# Patient Record
Sex: FEMALE | Race: WHITE | Employment: UNEMPLOYED | URBAN - METROPOLITAN AREA
[De-identification: names, ages, dates, MRNs, and addresses within clinical notes are randomized per-mention and may not be internally consistent; named-entity substitution may affect disease eponyms.]

---

## 2024-06-03 ENCOUNTER — APPOINTMENT (OUTPATIENT)
Dept: GENERAL RADIOLOGY | Age: 19
End: 2024-06-03
Attending: PHYSICIAN ASSISTANT

## 2024-06-03 ENCOUNTER — HOSPITAL ENCOUNTER (EMERGENCY)
Age: 19
Discharge: HOME | End: 2024-06-03
Attending: EMERGENCY MEDICINE

## 2024-06-03 VITALS
RESPIRATION RATE: 22 BRPM | TEMPERATURE: 100 F | DIASTOLIC BLOOD PRESSURE: 82 MMHG | SYSTOLIC BLOOD PRESSURE: 127 MMHG | BODY MASS INDEX: 31.28 KG/M2 | OXYGEN SATURATION: 98 % | HEIGHT: 62 IN | HEART RATE: 120 BPM | WEIGHT: 170 LBS

## 2024-06-03 DIAGNOSIS — J02.0 STREPTOCOCCUS PHARYNGITIS: ICD-10-CM

## 2024-06-03 DIAGNOSIS — J06.9 VIRAL UPPER RESPIRATORY ILLNESS: Primary | ICD-10-CM

## 2024-06-03 LAB
POCT INFLUENZA A: NEGATIVE
POCT INFLUENZA B: NEGATIVE
SARS-COV-2 RNA RESP QL NAA+PROBE: NOT DETECTED

## 2024-06-03 PROCEDURE — 94640 AIRWAY INHALATION TREATMENT: CPT

## 2024-06-03 PROCEDURE — 87430 STREP A AG IA: CPT | Performed by: PHYSICIAN ASSISTANT

## 2024-06-03 PROCEDURE — 71046 X-RAY EXAM CHEST 2 VIEWS: CPT | Performed by: PHYSICIAN ASSISTANT

## 2024-06-03 PROCEDURE — 99284 EMERGENCY DEPT VISIT MOD MDM: CPT

## 2024-06-03 PROCEDURE — 87502 INFLUENZA DNA AMP PROBE: CPT | Performed by: PHYSICIAN ASSISTANT

## 2024-06-03 RX ORDER — ALBUTEROL SULFATE 90 UG/1
4 AEROSOL, METERED RESPIRATORY (INHALATION) ONCE
Status: COMPLETED | OUTPATIENT
Start: 2024-06-03 | End: 2024-06-03

## 2024-06-03 RX ORDER — AMOXICILLIN AND CLAVULANATE POTASSIUM 875; 125 MG/1; MG/1
1 TABLET, FILM COATED ORAL 2 TIMES DAILY
Qty: 20 TABLET | Refills: 0 | Status: SHIPPED | OUTPATIENT
Start: 2024-06-03 | End: 2024-06-13

## 2024-06-03 RX ORDER — PREDNISONE 20 MG/1
40 TABLET ORAL DAILY
Qty: 10 TABLET | Refills: 0 | Status: SHIPPED | OUTPATIENT
Start: 2024-06-03 | End: 2024-06-08

## 2024-06-04 NOTE — ED PROVIDER NOTES
Patient Seen in: [facility] In [place]      History     Chief Complaint   Patient presents with    Cough     Stated Complaint: Cough x1week - difficulty breathing O2 99%    Subjective:   HPI    18-year-old female.  No significant medical history.  Patient states that she has had a persistent cough for the past month.  She was evaluated by her primary care physician and placed on some type medication.  While on this medication, her symptoms did improve but never totally resolved.  Acutely, today, patient is also developed fever, myalgia, headache and new sore throat.  Cough persists.  No vomiting or diarrhea    Objective:   History reviewed. No pertinent past medical history.           History reviewed. No pertinent surgical history.             Social History     Socioeconomic History    Marital status: Single   Tobacco Use    Smoking status: Never    Smokeless tobacco: Never              Review of Systems    Positive for stated complaint: Cough x1week - difficulty breathing O2 99%  Other systems are as noted in HPI.  Constitutional and vital signs reviewed.      All other systems reviewed and negative except as noted above.    Physical Exam     ED Triage Vitals [06/03/24 2137]   /82   Pulse 120   Resp 22   Temp 99.6 °F (37.6 °C)   Temp src Oral   SpO2 98 %   O2 Device None (Room air)       Current Vitals:   Vital Signs  BP: 127/82  Pulse: 120  Resp: 22  Temp: 99.6 °F (37.6 °C)  Temp src: Oral    Oxygen Therapy  SpO2: 98 %  O2 Device: None (Room air)            Physical Exam    Gen: Well appearing, well groomed, alert and aware x 3  Neck: Supple, full range of motion, no thyromegaly or lymphadenopathy.  Eye examination: EOMs are intact, normal conjunctival  ENT: No injection noted to the bilateral auditory canals; no loss of landmarks. Normal nasal mucosa without audible nasal congestion.  Oropharynx is patent without evidence of erythema, exudates or deviation.  No stridor to  auscultation  Lung: No distress, RR, no retraction, expiratory wheezing to the right upper lobe  Cardio: Tachycardic, normal S1-S2, no murmur appreciable  Skin: No sign of trauma, Skin warm and dry, no induration or sign of infection.  No rash noted      ED Course     Labs Reviewed   RAPID STREP A SCREEN (LC) - Abnormal; Notable for the following components:       Result Value    Rapid Strep A Result Positive for Beta Streptococcus, Group A (*)     All other components within normal limits   RAPID SARS-COV-2 BY PCR - Normal   POCT FLU TEST - Normal    Narrative:     This assay is a rapid molecular in vitro test utilizing nucleic acid amplification of influenza A and B viral RNA.                      MOMO      My supervising physician was involved in the management of this patient.    Patient arrives febrile and tachycardic.  She received antipyretics while in the triage area.  She has expiratory wheezing to the right upper lobe.  She complains of new malaise, myalgia, headache and sore throat today.  Continue cough for the past month.  Possibly worsened    Influenza, COVID and strep performed.  Chest x-ray to rule out pneumonia      Influenza and COVID are negative.  Returned positive for Streptococcus pharyngitis.  Chest x-ray demonstrates no acute consolidation    Respiratory technician perform spacer training.  4 puffs of albuterol administered.  Wheezing resolved.  Patient placed on Augmentin and prednisone.  Continue albuterol.                               Medical Decision Making      Disposition and Plan     Clinical Impression:  1. Viral upper respiratory illness    2. Streptococcus pharyngitis         Disposition:  Discharge  6/3/2024 11:45 pm    Follow-up:  No follow-up provider specified.        Medications Prescribed:  Current Discharge Medication List        START taking these medications    Details   amoxicillin clavulanate 875-125 MG Oral Tab Take 1 tablet by mouth 2 (two) times daily for 10  days.  Qty: 20 tablet, Refills: 0      predniSONE 20 MG Oral Tab Take 2 tablets (40 mg total) by mouth daily for 5 days.  Qty: 10 tablet, Refills: 0

## 2024-06-04 NOTE — DISCHARGE INSTRUCTIONS
Continue inhaler usage.  Take antibiotic as written.  Take steroid course.  Push clear fluids.  Get plenty of rest

## 2024-06-04 NOTE — ED INITIAL ASSESSMENT (HPI)
Patient here with cough. States she saw her doctor a month ago, started on medications for possible bronchitis. States symptoms had improved. Woke today with cough, sore throat and shortness of breath. Denies fevers.

## 2025-04-29 ENCOUNTER — HOSPITAL ENCOUNTER (EMERGENCY)
Age: 20
Discharge: HOME | End: 2025-04-29
Attending: EMERGENCY MEDICINE

## 2025-04-29 VITALS
HEART RATE: 99 BPM | DIASTOLIC BLOOD PRESSURE: 78 MMHG | SYSTOLIC BLOOD PRESSURE: 114 MMHG | HEIGHT: 62 IN | BODY MASS INDEX: 32.2 KG/M2 | TEMPERATURE: 97 F | WEIGHT: 175 LBS | RESPIRATION RATE: 18 BRPM | OXYGEN SATURATION: 98 %

## 2025-04-29 DIAGNOSIS — B34.9 VIRAL SYNDROME: Primary | ICD-10-CM

## 2025-04-29 PROCEDURE — 87430 STREP A AG IA: CPT

## 2025-04-29 PROCEDURE — 99283 EMERGENCY DEPT VISIT LOW MDM: CPT

## 2025-04-29 PROCEDURE — 87430 STREP A AG IA: CPT | Performed by: EMERGENCY MEDICINE

## 2025-04-29 RX ORDER — VALACYCLOVIR HYDROCHLORIDE 1 G/1
1000 TABLET, FILM COATED ORAL EVERY 12 HOURS
Qty: 14 TABLET | Refills: 0 | Status: SHIPPED | OUTPATIENT
Start: 2025-04-29 | End: 2025-05-06

## 2025-04-30 NOTE — ED PROVIDER NOTES
Patient Seen in: [facility] In [place]      History     Chief Complaint   Patient presents with    Sore Throat     Stated Complaint: sore throat, rash    Subjective:   HPI    Here for rash on dorsums of both hands predominately of her right as well as a sore throat.      Child and family recently had influenza    No fevers  No respiratory symptoms  No GI symptoms  No known exposures    History of Present Illness               Objective:     History reviewed. No pertinent past medical history.           History reviewed. No pertinent surgical history.             Social History     Socioeconomic History    Marital status: Single   Tobacco Use    Smoking status: Never    Smokeless tobacco: Never   Vaping Use    Vaping status: Never Used   Substance and Sexual Activity    Alcohol use: Never    Drug use: Never                                Physical Exam     ED Triage Vitals [04/29/25 1329]   /78   Pulse 99   Resp 18   Temp 97.2 °F (36.2 °C)   Temp src Temporal   SpO2 98 %   O2 Device        Current Vitals:   Vital Signs  BP: 114/78  Pulse: 99  Resp: 18  Temp: 97.2 °F (36.2 °C)  Temp src: Temporal    Oxygen Therapy  SpO2: 98 %        Physical Exam  Physical Exam   Constitutional: Awake, alert, well appearing  Head: Normocephalic and atraumatic.   Eyes: Conjunctivae are normal. Pupils are equal, round, and reactive to light.   Neck: Normal range of motion. No JVD  Cardiovascular: Normal rate, regular rhythm  Pulmonary/Chest: Normal effort.  No accessory muscle use.  No cyanosis.  Abdominal: Soft. Not distended.  Neurological: Pt is alert and oriented to person, place, and time. no cranial nerve deficits. Speech fluent      Oropharynx with with erythema, no swelling, there are 3-4 blisters and ulcerations scattered throughout.      Herpetic appearing rash predominant in the dorsum of her right hand less so on her left.    Blistered  No sloughing  Pruritic per patient but  nontender    Blanches  Physical Exam                ED Course     Labs Reviewed   RAPID STREP A SCREEN (LC) - Normal    Narrative:     A confirmatory culture is recommended if clinically indicated.          Results                                 MDM      Differential: Hand-foot-and-mouth/coxsackie, zoster, HSV, other viral syndrome.    -Suspect some sort of viral syndrome.  No evidence for bacterial process.  Discussed possibility of this being a herpes virus infection and will give her zoster to see if it helps.    Would return if there is any evolution of symptoms.    Of note, the patient is fully vaccinated, do not suspect measles        Medical Decision Making      Disposition and Plan     Clinical Impression:  1. Viral syndrome         Disposition:  Discharge  4/29/2025  2:15 pm    Follow-up:  Cira Lara MD  [address]  [facility] [address] [phone]    Follow up            Medications Prescribed:  Discharge Medication List as of 4/29/2025  2:17 PM        START taking these medications    Details   valACYclovir 1 G Oral Tab Take 1 tablet (1,000 mg total) by mouth every 12 (twelve) hours for 7 days., Normal, Disp-14 tablet, R-0             Supplementary Documentation:

## 2025-05-24 ENCOUNTER — APPOINTMENT (OUTPATIENT)
Dept: GENERAL RADIOLOGY | Age: 20
End: 2025-05-24
Attending: EMERGENCY MEDICINE

## 2025-05-24 ENCOUNTER — HOSPITAL ENCOUNTER (EMERGENCY)
Age: 20
Discharge: HOME | End: 2025-05-24
Attending: EMERGENCY MEDICINE

## 2025-05-24 VITALS
DIASTOLIC BLOOD PRESSURE: 68 MMHG | HEART RATE: 85 BPM | HEIGHT: 62 IN | WEIGHT: 198.63 LBS | RESPIRATION RATE: 20 BRPM | OXYGEN SATURATION: 100 % | SYSTOLIC BLOOD PRESSURE: 115 MMHG | BODY MASS INDEX: 36.55 KG/M2 | TEMPERATURE: 99 F

## 2025-05-24 DIAGNOSIS — J98.11 ATELECTASIS: ICD-10-CM

## 2025-05-24 DIAGNOSIS — J98.01 BRONCHOSPASM: ICD-10-CM

## 2025-05-24 DIAGNOSIS — R05.1 ACUTE COUGH: Primary | ICD-10-CM

## 2025-05-24 PROCEDURE — 99284 EMERGENCY DEPT VISIT MOD MDM: CPT

## 2025-05-24 PROCEDURE — 71045 X-RAY EXAM CHEST 1 VIEW: CPT | Performed by: EMERGENCY MEDICINE

## 2025-05-24 PROCEDURE — 87502 INFLUENZA DNA AMP PROBE: CPT | Performed by: EMERGENCY MEDICINE

## 2025-05-24 PROCEDURE — 94640 AIRWAY INHALATION TREATMENT: CPT

## 2025-05-24 RX ORDER — ALBUTEROL SULFATE 0.83 MG/ML
2.5 SOLUTION RESPIRATORY (INHALATION) EVERY 4 HOURS PRN
Qty: 30 EACH | Refills: 0 | Status: SHIPPED | OUTPATIENT
Start: 2025-05-24 | End: 2025-06-23

## 2025-05-24 RX ORDER — ACETAMINOPHEN 500 MG
1000 TABLET ORAL ONCE
Status: COMPLETED | OUTPATIENT
Start: 2025-05-24 | End: 2025-05-24

## 2025-05-24 RX ORDER — BENZONATATE 200 MG/1
200 CAPSULE ORAL 3 TIMES DAILY PRN
Qty: 30 CAPSULE | Refills: 0 | Status: SHIPPED | OUTPATIENT
Start: 2025-05-24 | End: 2025-06-23

## 2025-05-24 RX ORDER — ALBUTEROL SULFATE 90 UG/1
2 INHALANT RESPIRATORY (INHALATION) EVERY 4 HOURS PRN
Qty: 1 EACH | Refills: 0 | Status: SHIPPED | OUTPATIENT
Start: 2025-05-24 | End: 2025-06-23

## 2025-05-24 RX ORDER — ALBUTEROL SULFATE 90 UG/1
2 INHALANT RESPIRATORY (INHALATION) ONCE
Status: COMPLETED | OUTPATIENT
Start: 2025-05-24 | End: 2025-05-24

## 2025-05-24 NOTE — ED INITIAL ASSESSMENT (HPI)
To ER for eval of a cough x1 week. Pt has occ posttussive emesis and states her back hurts from coughing. The lungs are clear to ausc. Taking Cepacol and a cough syrup without success.

## 2025-05-24 NOTE — ED PROVIDER NOTES
Patient Seen in: [facility] In [place]        History  Chief Complaint   Patient presents with    Cough/URI     \"I've taken everything for it and it won't stop\"     Stated Complaint: cough 1 week    Subjective:   Patient is a 19-year-old female presents emergency room for cough for the last week.  Patient reports she has tried \"everything\" patient has tried Cepacol lozenges and 1 dose of Delsym on clarification.  Patient has had no fevers.  Positive sick contacts she has been under a great deal of stress.  Patient denies tobacco use vaping etc. she denies possibility of pregnancy nontoxic-appearing.  Occasional bronchospastic cough    The history is provided by the patient and a parent.                     Objective:     History reviewed. No pertinent past medical history.           History reviewed. No pertinent surgical history.             Social History     Socioeconomic History    Marital status: Single   Tobacco Use    Smoking status: Never    Smokeless tobacco: Never   Vaping Use    Vaping status: Never Used   Substance and Sexual Activity    Alcohol use: Never    Drug use: Never                                Physical Exam    ED Triage Vitals [05/24/25 0250]   /68   Pulse 85   Resp 20   Temp 98.7 °F (37.1 °C)   Temp src Oral   SpO2 100 %   O2 Device None (Room air)       Current Vitals:   Vital Signs  BP: 115/68  Pulse: 85  Resp: 20  Temp: 98.7 °F (37.1 °C)  Temp src: Oral    Oxygen Therapy  SpO2: 100 %  O2 Device: None (Room air)            Physical Exam  Vitals and nursing note reviewed.   Constitutional:       General: She is not in acute distress.     Appearance: Normal appearance. She is normal weight. She is not ill-appearing, toxic-appearing or diaphoretic.   HENT:      Head: Normocephalic and atraumatic.      Mouth/Throat:      Mouth: Mucous membranes are moist.   Eyes:      Extraocular Movements: Extraocular movements intact.      Pupils: Pupils are equal, round, and  reactive to light.   Cardiovascular:      Rate and Rhythm: Normal rate and regular rhythm.      Pulses: Normal pulses.   Pulmonary:      Effort: Pulmonary effort is normal. No respiratory distress.      Breath sounds: Normal breath sounds. No wheezing.      Comments: Bronchospastic cough  Abdominal:      General: Bowel sounds are normal. There is no distension.      Palpations: Abdomen is soft.      Tenderness: There is no abdominal tenderness.   Musculoskeletal:         General: Normal range of motion.   Skin:     General: Skin is warm.      Capillary Refill: Capillary refill takes less than 2 seconds.   Neurological:      General: No focal deficit present.      Mental Status: She is alert and oriented to person, place, and time.      Cranial Nerves: No cranial nerve deficit.      Sensory: No sensory deficit.      Motor: No weakness.   Psychiatric:         Mood and Affect: Mood normal.         Behavior: Behavior normal.                 ED Course  Labs Reviewed   POCT FLU TEST - Normal    Narrative:     This assay is a rapid molecular in vitro test utilizing nucleic acid amplification of influenza A and B viral RNA.   RAPID SARS-COV-2 BY PCR - Normal          Chest x-ray shows linear densities in the medial lung bases likely representing atelectasis mildly reduced lung volumes no acute abnormality no infiltrate or pleural effusion or pneumothorax normal heart size                  MDM     Social -negative tobacco, negative etoh, negative drugs  Family History-noncontributory  Past Medical History-no significant past medical history    Differential diagnosis before testing included viral syndrome, bronchospasm, bronchitis, pneumonia    Co-morbidities that add to the complexity of management include: None    Testing ordered during this visit included chest x-ray swabs for COVID and flu    Radiographic images  I personally reviewed the radiographs and my individual interpretation shows atelectasis  I also reviewed the  official reports that showed Chest x-ray shows linear densities in the medial lung bases likely representing atelectasis mildly reduced lung volumes no acute abnormality no infiltrate or pleural effusion or pneumothorax normal heart size    External chart review showed review of Care Everywhere in epic system shows no related comorbidities to current presentation    History obtained by an independent source included from patient    Discussion of management with patient    Social determinants of health that affect care include no listed primary care physician      Medications Provided: Albuterol, Tessalon Perles    Course of Events during Emergency Room Visit include 19-year-old female presents emergency room for evaluation of cough.  Will get a chest x-ray and swabs for COVID and flu.  Patient in no respiratory distress.  Patient given albuterol inhaler and Tessalon Perles for home recommend symptomatic treatment is likely viral in nature.  Patient to follow-up with primary care physician    Patient has atelectasis on chest x-ray will be given incentive spirometer.  Patient to follow-up with primary care physician      Patient requested a nebulizer prescription as well as she has a machine at home which I will give    Disposition:        Discharge  I have discussed with the patient the results of test, differential diagnosis, treatment plan, warning signs and symptoms which should prompt immediate return.  They expressed understanding of these instructions and agrees to the following plan provided.  They were given written discharge instructions and agrees to return for any concerns and voiced understanding and all questions were answered.           Medical Decision Making      Disposition and Plan     Clinical Impression:  1. Acute cough    2. Bronchospasm    3. Atelectasis         Disposition:  Discharge  5/24/2025  3:34 am    Follow-up:  Burt Guzman MD  [address] [facility] [address]  [facility]  [address]  [phone]    Schedule an appointment as soon as possible for a visit            Medications Prescribed:  Current Discharge Medication List        START taking these medications    Details   albuterol 108 (90 Base) MCG/ACT Inhalation Aero Soln Inhale 2 puffs into the lungs every 4 (four) hours as needed for Wheezing.  Qty: 1 each, Refills: 0      benzonatate 200 MG Oral Cap Take 1 capsule (200 mg total) by mouth 3 (three) times daily as needed for cough.  Qty: 30 capsule, Refills: 0                   Supplementary Documentation:

## (undated) NOTE — LETTER
Date & Time: 6/3/2024, 11:45 PM  Patient: Nimo Leyva  Encounter Provider(s):    Jc Gautam DO Johnston, Glennon, PA-C       To Whom It May Concern:    Nimo Leyva was seen and treated in our department on 6/3/2024. She should not return to work until 6/6/24 .    If you have any questions or concerns, please do not hesitate to call.        _____________________________  Physician/APC Signature